# Patient Record
Sex: FEMALE | URBAN - METROPOLITAN AREA
[De-identification: names, ages, dates, MRNs, and addresses within clinical notes are randomized per-mention and may not be internally consistent; named-entity substitution may affect disease eponyms.]

---

## 2021-03-23 ENCOUNTER — OFFICE VISIT (OUTPATIENT)
Dept: URBAN - METROPOLITAN AREA CLINIC 35 | Facility: CLINIC | Age: 61
End: 2021-03-23

## 2021-03-23 VITALS
SYSTOLIC BLOOD PRESSURE: 130 MMHG | WEIGHT: 224 LBS | TEMPERATURE: 97.5 F | DIASTOLIC BLOOD PRESSURE: 80 MMHG | BODY MASS INDEX: 42.29 KG/M2 | HEIGHT: 61 IN

## 2021-03-23 RX ORDER — VENLAFAXINE HYDROCHLORIDE 75 MG/1
TAKE 1 CAPSULE BY MOUTH EVERY DAY CAPSULE, EXTENDED RELEASE ORAL
Qty: 90 UNSPECIFIED | Refills: 1 | Status: ACTIVE | COMMUNITY

## 2021-03-23 NOTE — HPI-MIGRATED HPI
;     Colorectal Cancer Screening : Patient is a 60 year old female whom presents today for consultation for a colonoscopy.  Patient admits a family history of cancers.  Patient currently admits 3-4 BMs per week with stools varying in consistency. No melena, or mucus. Patient admits 0-1 episode a month of hemorrhoidal bleeding.   Yes,  sleep apnea, she uses CPAP nightly. She does not know what type of sleep apnea she has.   No abdominal pain. No weight loss No CP, SOB or fever. No blood thinners. No cardiac or pulmonary issues   Colonoscopy 9 years ago with finding of TA polyp (Dr Salazar);

## 2021-03-23 NOTE — EXAM-MIGRATED EXAMINATIONS
GENERAL APPEARANCE: - alert, in no acute distress, well developed, well nourished;   HEAD: - normocephalic, atraumatic;   EYES: - sclera anicteric bilaterally;   ORAL CAVITY: - mucosa moist, MP 4;   NECK/THYROID: - neck supple, full range of motion, no cervical lymphadenopathy,  no thyromegaly, trachea midline;   SKIN: - warm and dry, no spider angiomata, palmar erythema or icterus;   HEART: - no murmurs, regular rate and rhythm, S1, S2 normal;   LUNGS: - clear to auscultation bilaterally, good air movement, no wheezes, rales, rhonchi;   ABDOMEN: - bowel sounds present, no masses palpable, no organomegaly , no rebound tenderness, soft, nontender, nondistended;   MUSCULOSKELETAL: - normal posture, normal gait and station, no decreased range of motion;   EXTREMITIES: - no clubbing, cyanosis, or edema;   PSYCH: - cooperative with exam, mood/affect full range;

## 2021-04-16 ENCOUNTER — OFFICE VISIT (OUTPATIENT)
Dept: URBAN - METROPOLITAN AREA MEDICAL CENTER 10 | Facility: MEDICAL CENTER | Age: 61
End: 2021-04-16

## 2021-05-05 ENCOUNTER — OFFICE VISIT (OUTPATIENT)
Dept: URBAN - METROPOLITAN AREA CLINIC 35 | Facility: CLINIC | Age: 61
End: 2021-05-05

## 2021-05-05 VITALS
OXYGEN SATURATION: 98 % | SYSTOLIC BLOOD PRESSURE: 116 MMHG | HEIGHT: 61 IN | WEIGHT: 221 LBS | BODY MASS INDEX: 41.72 KG/M2 | DIASTOLIC BLOOD PRESSURE: 70 MMHG | HEART RATE: 88 BPM

## 2021-05-05 RX ORDER — VENLAFAXINE HYDROCHLORIDE 75 MG/1
TAKE 1 CAPSULE BY MOUTH EVERY DAY CAPSULE, EXTENDED RELEASE ORAL
Qty: 90 UNSPECIFIED | Refills: 1 | Status: ACTIVE | COMMUNITY

## 2021-05-05 NOTE — HPI-MIGRATED HPI
;     Colorectal Cancer Screening : Patient presents today to review colonoscopy completed on 4.16.2021. She denies any complications post procedure.   Currently admits 1 BM every other day with soft stools without strain. No melena, blood, or mucus.   Recent Colonoscopy and path documented below. No polyps found.  She had a colonoscopy 9 yrs ago with findings of one TA.  Last visit 3.23.2021 Patient is a 60 year old female whom presents today for consultation for a colonoscopy.  Patient admits a family history of cancers.  Patient currently admits 3-4 BMs per week with stools varying in consistency. No melena, or mucus. Patient admits 0-1 episode a month of hemorrhoidal bleeding.   Yes,  sleep apnea, she uses CPAP nightly. She does not know what type of sleep apnea she has.   No abdominal pain. No weight loss No CP, SOB or fever. No blood thinners. No cardiac or pulmonary issues   Colonoscopy 9 years ago with finding of TA polyp (Dr Salazar);

## 2021-05-20 PROBLEM — 429047008: Status: ACTIVE | Noted: 2021-03-23
